# Patient Record
Sex: FEMALE | Race: WHITE | NOT HISPANIC OR LATINO | URBAN - METROPOLITAN AREA
[De-identification: names, ages, dates, MRNs, and addresses within clinical notes are randomized per-mention and may not be internally consistent; named-entity substitution may affect disease eponyms.]

---

## 2023-05-15 ENCOUNTER — OFFICE VISIT (OUTPATIENT)
Dept: URBAN - METROPOLITAN AREA CLINIC 70 | Facility: CLINIC | Age: 63
End: 2023-05-15

## 2023-06-06 ENCOUNTER — OFFICE VISIT (OUTPATIENT)
Dept: URBAN - METROPOLITAN AREA CLINIC 70 | Facility: CLINIC | Age: 63
End: 2023-06-06

## 2024-01-12 ENCOUNTER — DASHBOARD ENCOUNTERS (OUTPATIENT)
Age: 64
End: 2024-01-12

## 2024-01-12 ENCOUNTER — OFFICE VISIT (OUTPATIENT)
Dept: URBAN - METROPOLITAN AREA CLINIC 70 | Facility: CLINIC | Age: 64
End: 2024-01-12
Payer: OTHER GOVERNMENT

## 2024-01-12 ENCOUNTER — LAB OUTSIDE AN ENCOUNTER (OUTPATIENT)
Dept: URBAN - METROPOLITAN AREA CLINIC 70 | Facility: CLINIC | Age: 64
End: 2024-01-12

## 2024-01-12 VITALS
TEMPERATURE: 97.5 F | DIASTOLIC BLOOD PRESSURE: 87 MMHG | SYSTOLIC BLOOD PRESSURE: 138 MMHG | HEART RATE: 106 BPM | BODY MASS INDEX: 31.29 KG/M2 | HEIGHT: 65 IN | WEIGHT: 187.8 LBS

## 2024-01-12 DIAGNOSIS — K59.01 CONSTIPATION: ICD-10-CM

## 2024-01-12 DIAGNOSIS — Z12.11 COLON CANCER SCREENING: ICD-10-CM

## 2024-01-12 PROCEDURE — 99203 OFFICE O/P NEW LOW 30 MIN: CPT | Performed by: REGISTERED NURSE

## 2024-01-12 RX ORDER — DULOXETINE 30 MG/1
CAPSULE, DELAYED RELEASE ORAL
Qty: 90 CAPSULE | Status: ACTIVE | COMMUNITY

## 2024-01-12 RX ORDER — FAMOTIDINE 40 MG/1
TAKE 1 TABLET BY MOUTH EVERY EVENING FOR INDIGESTION TABLET, FILM COATED ORAL
Qty: 90 EACH | Refills: 1 | Status: ACTIVE | COMMUNITY

## 2024-01-12 NOTE — HPI-TODAY'S VISIT:
Pt presents for colon cancer screening. Last colonoscopy was done 10 years ago. No family history of colon cancer. Pt denies any abdominal pain, diarrhea, rectal bleeding or weight loss. She has occasional mild constipation that is easily relieved with Miralax.  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 She had a SBO in 2023 and had a small bowel resection with JANNET.

## 2024-02-01 ENCOUNTER — COLON (OUTPATIENT)
Dept: URBAN - METROPOLITAN AREA SURGERY CENTER 24 | Facility: SURGERY CENTER | Age: 64
End: 2024-02-01